# Patient Record
Sex: MALE | Race: OTHER | HISPANIC OR LATINO
[De-identification: names, ages, dates, MRNs, and addresses within clinical notes are randomized per-mention and may not be internally consistent; named-entity substitution may affect disease eponyms.]

---

## 2018-05-10 ENCOUNTER — TRANSCRIPTION ENCOUNTER (OUTPATIENT)
Age: 33
End: 2018-05-10

## 2018-05-11 PROBLEM — Z00.00 ENCOUNTER FOR PREVENTIVE HEALTH EXAMINATION: Status: ACTIVE | Noted: 2018-05-11

## 2018-05-12 ENCOUNTER — APPOINTMENT (OUTPATIENT)
Dept: ORTHOPEDIC SURGERY | Facility: CLINIC | Age: 33
End: 2018-05-12

## 2019-05-04 ENCOUNTER — EMERGENCY (EMERGENCY)
Facility: HOSPITAL | Age: 34
LOS: 1 days | Discharge: HOME | End: 2019-05-04
Attending: EMERGENCY MEDICINE
Payer: COMMERCIAL

## 2019-05-04 VITALS
TEMPERATURE: 99 F | OXYGEN SATURATION: 100 % | SYSTOLIC BLOOD PRESSURE: 137 MMHG | WEIGHT: 186.95 LBS | DIASTOLIC BLOOD PRESSURE: 94 MMHG | HEART RATE: 110 BPM | HEIGHT: 67 IN | RESPIRATION RATE: 20 BRPM

## 2019-05-04 PROCEDURE — 99282 EMERGENCY DEPT VISIT SF MDM: CPT

## 2019-05-04 NOTE — ED PROVIDER NOTE - ATTENDING CONTRIBUTION TO CARE
sore throat with right ear pain    mild dry cough    mild pharyngeal erythema    anterior cervical lymphadenoapthy    lungs clear   suppostive care

## 2019-05-04 NOTE — ED PROVIDER NOTE - PHYSICAL EXAMINATION
Mild bilateral anterior cervical chain lymphadenopathy. Mild pharyngeal erythema. No tonsillar swelling or exudates. Uvula is midline. No trismus. Neck supple. Full ROM.

## 2019-05-04 NOTE — ED PROVIDER NOTE - PROGRESS NOTE DETAILS
Pt is well appearing walking with steady gait, stable for discharge and follow up without fail with medical doctor. I had a detailed discussion with the patient and/or guardian regarding the historical points, exam findings, and any diagnostic results supporting the discharge diagnosis. Pt educated on care and need for follow up. Strict return instructions and red flag signs and symptoms discussed with patient. Questions answered. Pt shows understanding of discharge information and agrees to follow.

## 2019-05-04 NOTE — ED PROVIDER NOTE - OBJECTIVE STATEMENT
32 y/o M with no significant PMHx presents to the ED with complaints of sore throat x 3 days. Patient notes gradual onset of moderate sore throat, associated with mild R ear pain. Patient notes symptoms alleviated with Cepacol and salt water gargles. Pain is associated with mild dry cough. Denies fever, chills, difficulty swallowing, voice changes, facial swelling or any other acute complaints.

## 2019-05-04 NOTE — ED PROVIDER NOTE - CLINICAL SUMMARY MEDICAL DECISION MAKING FREE TEXT BOX
34 y/o M presents with throat pain. Patient is well appearing; vitals within normal limits and afebrile. History and findings suggestive of viral pharyngitis with some reaction lymphadenopathy. Advised supportive treatment and PMD follow up in 3-4 days. 34 y/o M presents with throat pain. Patient is well appearing; vitals within normal limits and afebrile. History and findings suggestive of viral pharyngitis with reactive lymphadenopathy. Advised supportive care and PMD follow up in 3-4 days.

## 2020-12-29 ENCOUNTER — EMERGENCY (EMERGENCY)
Facility: HOSPITAL | Age: 35
LOS: 0 days | Discharge: HOME | End: 2020-12-30
Attending: STUDENT IN AN ORGANIZED HEALTH CARE EDUCATION/TRAINING PROGRAM
Payer: SELF-PAY

## 2020-12-29 VITALS
DIASTOLIC BLOOD PRESSURE: 84 MMHG | OXYGEN SATURATION: 95 % | TEMPERATURE: 99 F | RESPIRATION RATE: 17 BRPM | WEIGHT: 190.04 LBS | HEIGHT: 66 IN | HEART RATE: 80 BPM | SYSTOLIC BLOOD PRESSURE: 138 MMHG

## 2020-12-29 DIAGNOSIS — R45.1 RESTLESSNESS AND AGITATION: ICD-10-CM

## 2020-12-29 DIAGNOSIS — F10.121 ALCOHOL ABUSE WITH INTOXICATION DELIRIUM: ICD-10-CM

## 2020-12-29 DIAGNOSIS — R00.0 TACHYCARDIA, UNSPECIFIED: ICD-10-CM

## 2020-12-29 DIAGNOSIS — R41.82 ALTERED MENTAL STATUS, UNSPECIFIED: ICD-10-CM

## 2020-12-29 DIAGNOSIS — R46.2 STRANGE AND INEXPLICABLE BEHAVIOR: ICD-10-CM

## 2020-12-29 LAB
ALBUMIN SERPL ELPH-MCNC: 4.1 G/DL — SIGNIFICANT CHANGE UP (ref 3.3–5)
ALP SERPL-CCNC: 108 U/L — SIGNIFICANT CHANGE UP (ref 40–120)
ALT FLD-CCNC: 66 U/L — SIGNIFICANT CHANGE UP (ref 12–78)
ANION GAP SERPL CALC-SCNC: 9 MMOL/L — SIGNIFICANT CHANGE UP (ref 5–17)
APAP SERPL-MCNC: < 2 UG/ML (ref 10–30)
AST SERPL-CCNC: 27 U/L — SIGNIFICANT CHANGE UP (ref 15–37)
BASOPHILS # BLD AUTO: 0.07 K/UL — SIGNIFICANT CHANGE UP (ref 0–0.2)
BASOPHILS NFR BLD AUTO: 0.9 % — SIGNIFICANT CHANGE UP (ref 0–2)
BILIRUB SERPL-MCNC: 0.2 MG/DL — SIGNIFICANT CHANGE UP (ref 0.2–1.2)
BUN SERPL-MCNC: 10 MG/DL — SIGNIFICANT CHANGE UP (ref 7–23)
CALCIUM SERPL-MCNC: 8.9 MG/DL — SIGNIFICANT CHANGE UP (ref 8.5–10.1)
CHLORIDE SERPL-SCNC: 106 MMOL/L — SIGNIFICANT CHANGE UP (ref 96–108)
CO2 SERPL-SCNC: 27 MMOL/L — SIGNIFICANT CHANGE UP (ref 22–31)
CREAT SERPL-MCNC: 1.01 MG/DL — SIGNIFICANT CHANGE UP (ref 0.5–1.3)
EOSINOPHIL # BLD AUTO: 0.25 K/UL — SIGNIFICANT CHANGE UP (ref 0–0.5)
EOSINOPHIL NFR BLD AUTO: 3.2 % — SIGNIFICANT CHANGE UP (ref 0–6)
ETHANOL SERPL-MCNC: 350 MG/DL — SIGNIFICANT CHANGE UP (ref 0–10)
GLUCOSE SERPL-MCNC: 98 MG/DL — SIGNIFICANT CHANGE UP (ref 70–99)
HCT VFR BLD CALC: 46.7 % — SIGNIFICANT CHANGE UP (ref 39–50)
HGB BLD-MCNC: 15.3 G/DL — SIGNIFICANT CHANGE UP (ref 13–17)
IMM GRANULOCYTES NFR BLD AUTO: 0.8 % — SIGNIFICANT CHANGE UP (ref 0–1.5)
LYMPHOCYTES # BLD AUTO: 1.56 K/UL — SIGNIFICANT CHANGE UP (ref 1–3.3)
LYMPHOCYTES # BLD AUTO: 19.9 % — SIGNIFICANT CHANGE UP (ref 13–44)
MCHC RBC-ENTMCNC: 30.8 PG — SIGNIFICANT CHANGE UP (ref 27–34)
MCHC RBC-ENTMCNC: 32.8 GM/DL — SIGNIFICANT CHANGE UP (ref 32–36)
MCV RBC AUTO: 94 FL — SIGNIFICANT CHANGE UP (ref 80–100)
MONOCYTES # BLD AUTO: 0.47 K/UL — SIGNIFICANT CHANGE UP (ref 0–0.9)
MONOCYTES NFR BLD AUTO: 6 % — SIGNIFICANT CHANGE UP (ref 2–14)
NEUTROPHILS # BLD AUTO: 5.41 K/UL — SIGNIFICANT CHANGE UP (ref 1.8–7.4)
NEUTROPHILS NFR BLD AUTO: 69.2 % — SIGNIFICANT CHANGE UP (ref 43–77)
NRBC # BLD: 0 /100 WBCS — SIGNIFICANT CHANGE UP (ref 0–0)
PLATELET # BLD AUTO: 388 K/UL — SIGNIFICANT CHANGE UP (ref 150–400)
POTASSIUM SERPL-MCNC: 3.3 MMOL/L — LOW (ref 3.5–5.3)
POTASSIUM SERPL-SCNC: 3.3 MMOL/L — LOW (ref 3.5–5.3)
PROT SERPL-MCNC: 8.1 GM/DL — SIGNIFICANT CHANGE UP (ref 6–8.3)
RBC # BLD: 4.97 M/UL — SIGNIFICANT CHANGE UP (ref 4.2–5.8)
RBC # FLD: 12.8 % — SIGNIFICANT CHANGE UP (ref 10.3–14.5)
SALICYLATES SERPL-MCNC: 2.9 MG/DL — SIGNIFICANT CHANGE UP (ref 2.8–20)
SODIUM SERPL-SCNC: 142 MMOL/L — SIGNIFICANT CHANGE UP (ref 135–145)
WBC # BLD: 7.82 K/UL — SIGNIFICANT CHANGE UP (ref 3.8–10.5)
WBC # FLD AUTO: 7.82 K/UL — SIGNIFICANT CHANGE UP (ref 3.8–10.5)

## 2020-12-29 PROCEDURE — 71045 X-RAY EXAM CHEST 1 VIEW: CPT | Mod: 26

## 2020-12-29 PROCEDURE — 93010 ELECTROCARDIOGRAM REPORT: CPT

## 2020-12-29 PROCEDURE — 99291 CRITICAL CARE FIRST HOUR: CPT

## 2020-12-29 RX ORDER — KETAMINE HYDROCHLORIDE 100 MG/ML
320 INJECTION INTRAMUSCULAR; INTRAVENOUS ONCE
Refills: 0 | Status: DISCONTINUED | OUTPATIENT
Start: 2020-12-29 | End: 2020-12-29

## 2020-12-29 RX ORDER — MIDAZOLAM HYDROCHLORIDE 1 MG/ML
5 INJECTION, SOLUTION INTRAMUSCULAR; INTRAVENOUS ONCE
Refills: 0 | Status: DISCONTINUED | OUTPATIENT
Start: 2020-12-29 | End: 2020-12-29

## 2020-12-29 RX ADMIN — MIDAZOLAM HYDROCHLORIDE 5 MILLIGRAM(S): 1 INJECTION, SOLUTION INTRAMUSCULAR; INTRAVENOUS at 20:25

## 2020-12-29 RX ADMIN — KETAMINE HYDROCHLORIDE 320 MILLIGRAM(S): 100 INJECTION INTRAMUSCULAR; INTRAVENOUS at 22:01

## 2020-12-29 NOTE — ED PROVIDER NOTE - PHYSICAL EXAMINATION
VITAL SIGNS: I have reviewed nursing notes and confirm.   GEN: Well-developed; well-nourished; in (+) severe agitation, no acute distress. (+) cursing at staff, Speaking full sentences. (+) ETOH on breath  SKIN: Warm, pink, no rash, no diaphoresis, no cyanosis, well perfused.   HEAD: Normocephalic; atraumatic. No scalp lacerations, no abrasions.  NECK: Supple; non tender.   EYES: Pupils 3mm equal, round, reactive to light and accomodation, conjunctiva and sclera clear. Extra-ocular movements intact bilaterally.  ENT: No nasal discharge; airway clear. Trachea is midline. ORAL: No oropharyngeal exudates or erythema. Normal dentition.  CV: (+) tachycardia. S1, S2 normal; no murmurs, gallops, or rubs. No lower extremity pitting edema bilaterally. Capillary refill < 2 seconds throughout. Distal pulses intact 2+ throughout.  RESP: CTA bilaterally. No wheezes, rales, or rhonchi.   ABD: Normal bowel sounds, soft, non-distended, non-tender, no hepatosplenomegaly. No CVA tenderness bilaterally.  MSK: Normal range of motion and movement of all 4 extremities. No joint or muscular pain throughout. No clubbing.   BACK: No thoracolumbar midline or paravertebral tenderness. No step-offs or obvious deformities.  NEURO: Alert & oriented x 3, Grossly unremarkable. Sensory and motor intact throughout. No focal deficits. Gait: Fluid.    PSYCH: Uncooperative, intoxicated.

## 2020-12-29 NOTE — ED PROVIDER NOTE - CLINICAL SUMMARY MEDICAL DECISION MAKING FREE TEXT BOX
ETOH related agitation likely, ddx: sympathomimetic toxidrome, PCP abuse. Sedated in ED with versed, ketamine, and placed in 4pt restraints. Found to have ETOH level 350. No other apparent traumatic injuries on exam. Labs unremarkable. CT HEAD and neck negative. awaiting clinical sobriety.

## 2020-12-29 NOTE — ED PROVIDER NOTE - PATIENT PORTAL LINK FT
You can access the FollowMyHealth Patient Portal offered by [facility] by registering at the following website: [email or web address]. By joining [employer]’s FollowMyHealth portal, you will also be able to view your health information using other applications (apps) compatible with our system.

## 2020-12-29 NOTE — ED PROVIDER NOTE - NSFOLLOWUPINSTRUCTIONS_ED_ALL_ED_FT
Alcohol Abuse    Alcohol intoxication occurs when the amount of alcohol that a person has consumed impairs his or her ability to mentally and physically function. Chronic alcohol consumption can also lead to a variety of health issues including neurological disease, stomach disease, heart disease, liver disease, etc. Do not drive after drinking alcohol. Drinking enough alcohol to end up in an Emergency Room suggests you may have an alcohol abuse problem. Seek help at a drug addiction center.    SEEK IMMEDIATE MEDICAL CARE IF YOU HAVE ANY OF THE FOLLOWING SYMPTOMS: seizures, vomiting blood, blood in your stool, lightheadedness/dizziness, or becoming shaky to tremulous when you stop drinking.     Take acetaminophen 650 mg orally every 6-8 hours for pain control as needed. Please do not exceed 4,000 mg of acetaminophen during a 24 hours period. Acetaminophen can be found in many over-the-counter cold medications as well as opioid medications that may be given for pain.    Take ibuprofen (also known as MOTRIN or ADVIL) 400 mg orally every 6-8 hours for pain control as needed with food to avoid an upset stomach. Ibuprofen can be found in many over-the-counter medications. Please do not take ibuprofen if you have a bleeding disorder, stomach or gastrointestinal ulcer, or liver disease.    If needed, you can alternate these medications so that you can take one medication every 3 hours. For example, at noon take ibuprofen, then at 3PM take acetaminophen, then at 6PM take ibuprofen. .    Rest, drink plenty of fluids.  Advance activity as tolerated.  Continue all previously prescribed medications as directed.  Follow up with your PMD 2-3 days and bring copies of your results.

## 2020-12-29 NOTE — ED ADULT NURSE NOTE - NSIMPLEMENTINTERV_GEN_ALL_ED
Implemented All Universal Safety Interventions:  [place] to call system. Call bell, personal items and telephone within reach. Instruct patient to call for assistance. Room bathroom lighting operational. Non-slip footwear when patient is off stretcher. Physically safe environment: no spills, clutter or unnecessary equipment. Stretcher in lowest position, wheels locked, appropriate side rails in place.

## 2020-12-29 NOTE — ED PROVIDER NOTE - PROGRESS NOTE DETAILS
s/p ketamine 4mg/kg, IM for agitated delerium after initial 5mg IM versed sedation wore off. unable to get ct head due to agitation. after ketamine, sedated, dissociated, tachycardic, endtidal 40-50s, no resp depression. etoh 350. d/c'd 4pt restraints, ct negative. ambulated to bathroom unassisted. likely agitated secondary to ETOH intoxication. Currently sleeping in stretcher. sleeping in stretcher, awaiting clinical sobriety. d/c'd 4pt restraints, ct negative. ambulated to bathroom unassisted. likely agitated secondary to ETOH intoxication. Currently sleeping in stretcher. tachycardia resolved. The patient is clinically sober. The patient is alert and oriented x 3, is clear and coherent in conversation and has a normal gait and shows no signs of acute intoxication. The patient is safe for discharge.

## 2020-12-29 NOTE — ED PROVIDER NOTE - OBJECTIVE STATEMENT
35M no pmhx presenting with agitation and bizarre behavior today. History obtained from EMS and police. Patient found walking around neighborhood, knocking on doors, intoxicated. Comes to the ED with handcuffs and severe agitation, uncooperative with staff and workup. (+) ETOH on breath. Treated with 5mg IM versed with sedation and placed on 4-pt restrains. Otherwise, no apparent traumatic injuries, fevers. 35M no pmhx presenting with agitation and bizarre behavior today. History obtained from EMS and police. Patient found walking around neighborhood, knocking on doors, intoxicated. Comes to the ED with handcuffs and severe agitation, uncooperative with staff and workup. (+) ETOH on breath. Treated with 5mg IM versed with sedation and placed on 4-pt restraints. Otherwise, no apparent traumatic injuries.

## 2020-12-29 NOTE — ED ADULT NURSE NOTE - OBJECTIVE STATEMENT
34 y/o Male BIBA and police found aggressive, belligerent. found in front of a house acting irrationally, dilate pupils. Pt was given versed and ketamine. 4 point restraint were applied. no circulation impairment

## 2020-12-30 VITALS
HEART RATE: 102 BPM | TEMPERATURE: 99 F | SYSTOLIC BLOOD PRESSURE: 112 MMHG | RESPIRATION RATE: 16 BRPM | OXYGEN SATURATION: 94 % | DIASTOLIC BLOOD PRESSURE: 77 MMHG

## 2020-12-30 PROCEDURE — 70450 CT HEAD/BRAIN W/O DYE: CPT | Mod: 26

## 2020-12-30 PROCEDURE — 72125 CT NECK SPINE W/O DYE: CPT | Mod: 26

## 2020-12-30 RX ORDER — MIDAZOLAM HYDROCHLORIDE 1 MG/ML
5 INJECTION, SOLUTION INTRAMUSCULAR; INTRAVENOUS ONCE
Refills: 0 | Status: COMPLETED | OUTPATIENT
Start: 2020-12-30 | End: 2020-12-30

## 2020-12-30 NOTE — ED ADULT NURSE REASSESSMENT NOTE - NS ED NURSE REASSESS COMMENT FT1
Received pt @ 510 PT is sleeping comfortably in bed no restraints present . Pt placed on EO will continue to monitor.

## 2021-05-03 ENCOUNTER — EMERGENCY (EMERGENCY)
Facility: HOSPITAL | Age: 36
LOS: 0 days | Discharge: HOME | End: 2021-05-03
Payer: SELF-PAY

## 2021-05-03 VITALS
SYSTOLIC BLOOD PRESSURE: 141 MMHG | DIASTOLIC BLOOD PRESSURE: 81 MMHG | RESPIRATION RATE: 17 BRPM | OXYGEN SATURATION: 98 % | HEART RATE: 114 BPM | WEIGHT: 190.04 LBS | TEMPERATURE: 99 F | HEIGHT: 67 IN

## 2021-05-03 DIAGNOSIS — R20.0 ANESTHESIA OF SKIN: ICD-10-CM

## 2021-05-03 DIAGNOSIS — F41.9 ANXIETY DISORDER, UNSPECIFIED: ICD-10-CM

## 2021-05-03 DIAGNOSIS — R06.4 HYPERVENTILATION: ICD-10-CM

## 2021-05-03 DIAGNOSIS — R07.9 CHEST PAIN, UNSPECIFIED: ICD-10-CM

## 2021-05-03 PROCEDURE — 93010 ELECTROCARDIOGRAM REPORT: CPT

## 2021-05-03 PROCEDURE — 99284 EMERGENCY DEPT VISIT MOD MDM: CPT

## 2021-05-03 RX ORDER — DIAZEPAM 5 MG
5 TABLET ORAL ONCE
Refills: 0 | Status: DISCONTINUED | OUTPATIENT
Start: 2021-05-03 | End: 2021-05-03

## 2021-05-03 RX ADMIN — Medication 5 MILLIGRAM(S): at 18:50

## 2021-05-03 NOTE — ED ADULT NURSE NOTE - PAIN RATING/NUMBER SCALE (0-10): REST
0
[FreeTextEntry1] : O:\par Vasc: DP 1/4 b/l, Unable to assess PT pulses due to wound location, Mild non-pitting edema b/l.  \par Derm: See wound assessment above.  Diffuse xerosis b/l.  hyperkeratotic border noted on left medial leg wound. \par Neuro: Decreased protective sensation b/l \par Musc: No pain with palpation periwound\par \par A: \par b/l leg non-pressure ulceration\par Venous stasis b/l\par \par P:\par Patient evaluated and chart reviewed.\par Wound cleansed with chlorhexidine scrub.\par Excisional debridement with sterile dermal curette of the left leg and right leg wounds with removal of non-viable skin and soft tissue down to and including the subcutaneous layer. \par Verbal consent obtained and Procedure tolerated well by the patient\par Wounds dressed with santyl, adaptic, 4x4 gauze, Binu, ACE\par RTC in 1 week

## 2021-05-03 NOTE — ED ADULT TRIAGE NOTE - NSWEIGHTCALCTOOLDRUG_GEN_A_CORE
Patient reports to clinic today with wrap off stating she took it off the day after applied as was too tight.  Patient had put on tetragrip.  No trauma to leg.  Leg warm and dry-no redness.      Wrap re-applied and patient reports feels ok.  Reviewed the proper procedure of removing wrap and wearing tetragrip should wrap be too tight again.    
 used

## 2021-05-03 NOTE — ED PROVIDER NOTE - PHYSICAL EXAMINATION
Physical Exam    Vital Signs: I have reviewed the initial vital signs.  Constitutional: well-nourished, appears stated age, no acute distress  Eyes: PERRLA, and symmetrical lids.  ENT: Neck supple with no adenopathy, moist MM.  Cardiovascular: regular rate, regular rhythm, well-perfused extremities  Respiratory: unlabored respiratory effort, clear to auscultation bilaterally  Gastrointestinal: soft, non-tender abdomen, no pulsatile mass  Musculoskeletal: supple neck, no lower extremity edema  Integumentary: warm, dry, no rash  Neurologic: extremities’ motor and sensory functions grossly intact  Psychiatric: A&Ox3

## 2021-05-03 NOTE — ED ADULT TRIAGE NOTE - CHIEF COMPLAINT QUOTE
36 y/o male with no PMH. BIBA with c/c of feeling anxious, pins and needle feeling in both hands that started at 1500 today. pt denies any N/V/D/C OR dizziness. no prodromal or focal deficit.

## 2021-05-03 NOTE — ED PROVIDER NOTE - OBJECTIVE STATEMENT
34 yo m pw chest pain that occurred while pt was sitting there and it was associated with b/l hand cramping with and paresthesias and fast breathing. No resolved. No unilateral swelling, hemoptysis, estogen supplementation, malignancy, recent immobilization or surgery, or prior DVT/PE.    I have reviewed available current nursing and previous documentation of past medical, surgical, family, and/or social history.

## 2021-05-03 NOTE — ED PROVIDER NOTE - CARE PROVIDER_API CALL
Nehemiah Enriquez)  Cardiology; Cardiovascular Disease; Nuclear Cardiology  [address] [place] [address]  Phone: [phone]  Fax: [phone]  Follow Up Time:

## 2021-05-03 NOTE — ED ADULT NURSE NOTE - OBJECTIVE STATEMENT
Patient presents in ED complaining of numbness in both arms and chest pressure, since this afternoon

## 2021-05-03 NOTE — ED PROVIDER NOTE - PATIENT PORTAL LINK FT
You can access the FollowMyHealth Patient Portal offered by [facility] by registering at the following website: [email or web address]. By joining [employer]’s FollowMyHealth portal, you will also be able to view your health information using other applications (apps) compatible with our system.

## 2021-05-04 PROBLEM — Z78.9 OTHER SPECIFIED HEALTH STATUS: Chronic | Status: ACTIVE | Noted: 2020-12-30

## 2021-05-23 ENCOUNTER — EMERGENCY (EMERGENCY)
Facility: HOSPITAL | Age: 36
LOS: 0 days | Discharge: HOME | End: 2021-05-24
Attending: STUDENT IN AN ORGANIZED HEALTH CARE EDUCATION/TRAINING PROGRAM
Payer: SELF-PAY

## 2021-05-23 VITALS
OXYGEN SATURATION: 98 % | WEIGHT: 197.98 LBS | HEIGHT: 67 IN | HEART RATE: 103 BPM | RESPIRATION RATE: 22 BRPM | TEMPERATURE: 98 F | SYSTOLIC BLOOD PRESSURE: 145 MMHG | DIASTOLIC BLOOD PRESSURE: 91 MMHG

## 2021-05-23 DIAGNOSIS — F41.0 PANIC DISORDER [EPISODIC PAROXYSMAL ANXIETY]: ICD-10-CM

## 2021-05-23 DIAGNOSIS — F41.9 ANXIETY DISORDER, UNSPECIFIED: ICD-10-CM

## 2021-05-23 DIAGNOSIS — F12.99 CANNABIS USE, UNSPECIFIED WITH UNSPECIFIED CANNABIS-INDUCED DISORDER: ICD-10-CM

## 2021-05-23 LAB
ALBUMIN SERPL ELPH-MCNC: 3.8 G/DL — SIGNIFICANT CHANGE UP (ref 3.3–5)
ALP SERPL-CCNC: 96 U/L — SIGNIFICANT CHANGE UP (ref 40–120)
ALT FLD-CCNC: 48 U/L — SIGNIFICANT CHANGE UP (ref 12–78)
ANION GAP SERPL CALC-SCNC: 10 MMOL/L — SIGNIFICANT CHANGE UP (ref 5–17)
AST SERPL-CCNC: 25 U/L — SIGNIFICANT CHANGE UP (ref 15–37)
BASOPHILS # BLD AUTO: 0.08 K/UL — SIGNIFICANT CHANGE UP (ref 0–0.2)
BASOPHILS NFR BLD AUTO: 0.8 % — SIGNIFICANT CHANGE UP (ref 0–2)
BILIRUB SERPL-MCNC: 0.2 MG/DL — SIGNIFICANT CHANGE UP (ref 0.2–1.2)
BUN SERPL-MCNC: 13 MG/DL — SIGNIFICANT CHANGE UP (ref 7–23)
CALCIUM SERPL-MCNC: 8.4 MG/DL — LOW (ref 8.5–10.1)
CHLORIDE SERPL-SCNC: 104 MMOL/L — SIGNIFICANT CHANGE UP (ref 96–108)
CO2 SERPL-SCNC: 25 MMOL/L — SIGNIFICANT CHANGE UP (ref 22–31)
CREAT SERPL-MCNC: 1.16 MG/DL — SIGNIFICANT CHANGE UP (ref 0.5–1.3)
EOSINOPHIL # BLD AUTO: 0.12 K/UL — SIGNIFICANT CHANGE UP (ref 0–0.5)
EOSINOPHIL NFR BLD AUTO: 1.2 % — SIGNIFICANT CHANGE UP (ref 0–6)
GLUCOSE SERPL-MCNC: 113 MG/DL — HIGH (ref 70–99)
HCT VFR BLD CALC: 42.1 % — SIGNIFICANT CHANGE UP (ref 39–50)
HGB BLD-MCNC: 14.3 G/DL — SIGNIFICANT CHANGE UP (ref 13–17)
IMM GRANULOCYTES NFR BLD AUTO: 1 % — SIGNIFICANT CHANGE UP (ref 0–1.5)
LYMPHOCYTES # BLD AUTO: 1.96 K/UL — SIGNIFICANT CHANGE UP (ref 1–3.3)
LYMPHOCYTES # BLD AUTO: 19.4 % — SIGNIFICANT CHANGE UP (ref 13–44)
MCHC RBC-ENTMCNC: 31.4 PG — SIGNIFICANT CHANGE UP (ref 27–34)
MCHC RBC-ENTMCNC: 34 GM/DL — SIGNIFICANT CHANGE UP (ref 32–36)
MCV RBC AUTO: 92.3 FL — SIGNIFICANT CHANGE UP (ref 80–100)
MONOCYTES # BLD AUTO: 0.71 K/UL — SIGNIFICANT CHANGE UP (ref 0–0.9)
MONOCYTES NFR BLD AUTO: 7 % — SIGNIFICANT CHANGE UP (ref 2–14)
NEUTROPHILS # BLD AUTO: 7.12 K/UL — SIGNIFICANT CHANGE UP (ref 1.8–7.4)
NEUTROPHILS NFR BLD AUTO: 70.6 % — SIGNIFICANT CHANGE UP (ref 43–77)
NRBC # BLD: 0 /100 WBCS — SIGNIFICANT CHANGE UP (ref 0–0)
PLATELET # BLD AUTO: 343 K/UL — SIGNIFICANT CHANGE UP (ref 150–400)
POTASSIUM SERPL-MCNC: 3.2 MMOL/L — LOW (ref 3.5–5.3)
POTASSIUM SERPL-SCNC: 3.2 MMOL/L — LOW (ref 3.5–5.3)
PROT SERPL-MCNC: 7.5 GM/DL — SIGNIFICANT CHANGE UP (ref 6–8.3)
RBC # BLD: 4.56 M/UL — SIGNIFICANT CHANGE UP (ref 4.2–5.8)
RBC # FLD: 12.8 % — SIGNIFICANT CHANGE UP (ref 10.3–14.5)
SODIUM SERPL-SCNC: 139 MMOL/L — SIGNIFICANT CHANGE UP (ref 135–145)
WBC # BLD: 10.09 K/UL — SIGNIFICANT CHANGE UP (ref 3.8–10.5)
WBC # FLD AUTO: 10.09 K/UL — SIGNIFICANT CHANGE UP (ref 3.8–10.5)

## 2021-05-23 PROCEDURE — 93010 ELECTROCARDIOGRAM REPORT: CPT

## 2021-05-23 PROCEDURE — 99285 EMERGENCY DEPT VISIT HI MDM: CPT

## 2021-05-23 PROCEDURE — 70450 CT HEAD/BRAIN W/O DYE: CPT | Mod: 26,MA

## 2021-05-23 RX ADMIN — Medication 1 MILLIGRAM(S): at 22:58

## 2021-05-23 NOTE — ED ADULT TRIAGE NOTE - CHIEF COMPLAINT QUOTE
Patient presents in ED complaining of numbness of both hands hyperventilating, dizziness. after watching TV

## 2021-05-23 NOTE — ED ADULT NURSE NOTE - OBJECTIVE STATEMENT
Pt alert and oriented, anxious, presents to ED complaining of numbness of both hands hyperventilating, dizziness. after watching TV. Pt stated had similar attack 2 weeks ago and was in the ED and was suppose to f/u with primary MD as recommended but did not. Pt stated also took aspirin 81mg for the chest pain. Pt denies any further PMH,

## 2021-05-23 NOTE — ED ADULT NURSE NOTE - NSIMPLEMENTINTERV_GEN_ALL_ED
Implemented All Universal Safety Interventions:  [place] to call system. Call bell, personal items and telephone within reach. Instruct patient to call for assistance. Room bathroom lighting operational. Non-slip footwear when patient is off stretcher. Physically safe environment: no spills, clutter or unnecessary equipment. Stretcher in lowest position, wheels locked, appropriate side rails in place.

## 2021-05-23 NOTE — ED PROVIDER NOTE - PATIENT PORTAL LINK FT
You can access the FollowMyHealth Patient Portal offered by [facility] by registering at the following website: [email or web address]. By joining [employer]’s FollowMyHealth portal, you will also be able to view your health information using other applications (apps) compatible with our system.

## 2021-05-23 NOTE — ED PROVIDER NOTE - CONSTITUTIONAL, MLM
Patient appears anxious. But awake, alert, oriented to person, place, time/situation and in no apparent distress. normal...

## 2021-05-23 NOTE — ED PROVIDER NOTE - OBJECTIVE STATEMENT
37 y/o M with no pertinent PMHx presents to the ED c/o a panic attack. Patient states he felt his body, hands and chest become tight. Patient also reports hyperventilating and having palpitations. Patient states he has been under a lot of stress recently as he found out his grandmother is dying. Patient was seen in the hospital for similar episodes in the past. Denies recent surgery, fever, chills or any other pain related symptoms. Patient states he smokes marijuana daily and admits to smoking today. Denies the marijuana being laced with any other substances. 35 y/o M with no pertinent PMHx presents to the ED c/o a panic attack. Patient states he felt his body, hands and chest become tight. Patient also reports hyperventilating and having palpitations. Patient states he has been under a lot of stress recently as he found out his grandmother is dying. Patient was seen in the hospital for similar episodes in the past, diagnosed with anxiety. Denies recent surgery, fever, chills or any other pain related symptoms. Patient states he smokes marijuana daily and admits to smoking today. Denies the marijuana being laced with any other substances.

## 2021-05-24 VITALS
OXYGEN SATURATION: 100 % | DIASTOLIC BLOOD PRESSURE: 88 MMHG | SYSTOLIC BLOOD PRESSURE: 134 MMHG | TEMPERATURE: 98 F | RESPIRATION RATE: 16 BRPM | HEART RATE: 84 BPM

## 2021-05-24 PROCEDURE — 71045 X-RAY EXAM CHEST 1 VIEW: CPT | Mod: 26

## 2021-05-24 RX ORDER — POTASSIUM CHLORIDE 20 MEQ
40 PACKET (EA) ORAL ONCE
Refills: 0 | Status: COMPLETED | OUTPATIENT
Start: 2021-05-24 | End: 2021-05-24

## 2021-05-24 RX ADMIN — Medication 40 MILLIEQUIVALENT(S): at 01:10

## 2022-08-10 ENCOUNTER — EMERGENCY (EMERGENCY)
Facility: HOSPITAL | Age: 37
LOS: 0 days | Discharge: HOME | End: 2022-08-10
Attending: STUDENT IN AN ORGANIZED HEALTH CARE EDUCATION/TRAINING PROGRAM

## 2022-08-10 VITALS
OXYGEN SATURATION: 99 % | SYSTOLIC BLOOD PRESSURE: 139 MMHG | RESPIRATION RATE: 18 BRPM | TEMPERATURE: 98 F | HEART RATE: 78 BPM | DIASTOLIC BLOOD PRESSURE: 77 MMHG

## 2022-08-10 VITALS
TEMPERATURE: 99 F | RESPIRATION RATE: 18 BRPM | OXYGEN SATURATION: 97 % | WEIGHT: 184.97 LBS | DIASTOLIC BLOOD PRESSURE: 71 MMHG | HEART RATE: 98 BPM | HEIGHT: 67 IN | SYSTOLIC BLOOD PRESSURE: 137 MMHG

## 2022-08-10 DIAGNOSIS — F41.9 ANXIETY DISORDER, UNSPECIFIED: ICD-10-CM

## 2022-08-10 DIAGNOSIS — R20.0 ANESTHESIA OF SKIN: ICD-10-CM

## 2022-08-10 DIAGNOSIS — R00.2 PALPITATIONS: ICD-10-CM

## 2022-08-10 PROCEDURE — 99284 EMERGENCY DEPT VISIT MOD MDM: CPT

## 2022-08-10 PROCEDURE — 93010 ELECTROCARDIOGRAM REPORT: CPT

## 2022-08-10 RX ORDER — ALPRAZOLAM 0.25 MG
1 TABLET ORAL ONCE
Refills: 0 | Status: DISCONTINUED | OUTPATIENT
Start: 2022-08-10 | End: 2022-08-10

## 2022-08-10 RX ORDER — ALPRAZOLAM 0.25 MG
1 TABLET ORAL
Qty: 15 | Refills: 0
Start: 2022-08-10 | End: 2022-08-14

## 2022-08-10 RX ADMIN — Medication 1 MILLIGRAM(S): at 22:58

## 2022-08-10 NOTE — ED PROVIDER NOTE - DOMESTIC TRAVEL HIGH RISK QUESTION
Well appearing, awake, alert, oriented to person, place, time/situation and in no apparent distress. No Yes normal...

## 2022-08-10 NOTE — ED PROVIDER NOTE - NSFOLLOWUPINSTRUCTIONS_ED_ALL_ED_FT
Activities as tolerated. Please encourage good oral and fluid intake. For pain, please take Motrin 400mg every 4 hours as needed, or Tylenol 650mg every 6 hours as needed.    For anxiety attack, please take xanax 1mg every 3 hours as needed. Do not drive if taking this medication.    Please see your primary care doctor within 24-48 hours for further management of your symptoms.    Please seek emergent medical management if you have any worsening signs or symptoms, such as worsening chest pain, difficulty breathing, loss of consciousness, or persistent vomiting.

## 2022-08-10 NOTE — ED PROVIDER NOTE - CLINICAL SUMMARY MEDICAL DECISION MAKING FREE TEXT BOX
38 yo male with pmhx anxiety p/w anxiety attack/palpitations. suggestive of anxiety attack. ekg shows NSR. no signs of ischemia or arrhythmia. will give bzd, will reassess.

## 2022-08-10 NOTE — ED ADULT NURSE REASSESSMENT NOTE - NS ED NURSE REASSESS COMMENT FT1
Pt A&Ox4, pt verbalizing that he is feeling better. Pt denies any CP or palpitations.. Skin warm and dry.

## 2022-08-10 NOTE — ED PROVIDER NOTE - OBJECTIVE STATEMENT
38 yo male with pmhx anxiety p/w anxiety attack/palpitations. was in office watching tv when he had sudden onset palpitations and BL hand/leg numbness/cramping. improved with lorazepam, but then returned again. came to ed. had similar presentation in past, dx with anxiety attack.    denies travel hx, loc.

## 2022-08-10 NOTE — ED PROVIDER NOTE - PATIENT PORTAL LINK FT
You can access the FollowMyHealth Patient Portal offered by [facility] by registering at the following website: [email or web address]. By joining [employer]’s FollowMyHealth portal, you will also be able to view your health information using other applications (apps) compatible with our system.

## 2022-08-10 NOTE — ED ADULT NURSE NOTE - OBJECTIVE STATEMENT
Pt presents to the ED for evaluation of possible panic attach. Pt verbalized that he had a similar episode last year. Pt reports experiencing CP, clenching fist and sweating. Pt verbalized that he was taking lorazepam last year that help relieved s/s. EKG done and read by MD.

## 2022-08-10 NOTE — ED ADULT TRIAGE NOTE - CHIEF COMPLAINT QUOTE
pt presents to the ED c/o midsternal chest pain and b/l hand cramping started today. pt states feels like anxiety attack had last year and given lorazepam and had relief. denies: SI, HI, hallucinations   hx anxiety. denies sob

## 2022-10-21 NOTE — ED ADULT TRIAGE NOTE - PAIN RATING/NUMBER SCALE (0-10): ACTIVITY
8 Dutasteride Pregnancy And Lactation Text: This medication is absolutely contraindicated in women, especially during pregnancy and breast feeding. Feminization of male fetuses is possible if taking while pregnant.

## 2024-01-12 ENCOUNTER — NON-APPOINTMENT (OUTPATIENT)
Age: 39
End: 2024-01-12

## 2025-01-05 NOTE — ED ADULT NURSE NOTE - CAS EDP DISCH TYPE
Physical Therapy Visit    Visit Type: Daily Treatment Note  Visit: 4  Referring Provider: Bianca Stearns MD  Medical Diagnosis (from order): M54.9 - Upper back pain     SUBJECTIVE                                                                                                               Chief complaint: lower back pain, increased pain with bending, getting up in the morning.    Patient reports lower back is doing better. Had one day last weekend that was sore after lifting but had no lower back pain with lifting or for the rest of the weekend.      OBJECTIVE                                                                                                                                         Treatment     Therapeutic Exercise  Performed:  Matrix leg press 190lbs x 15 reps  Step ups x 10 reps with a 20lbs   Iso step ups lunge x 10 reps  abdominal series   Bridging x 15 reps  Opposite arm and leg raises in 4 point   paloff press with 15lbs on cables each side    Prone lying x 1 minutes  Prone on elbows x 1 minutes  Prone press ups 2 x 10 reps    TRX: gilberto dead lifts' x 10 reps, mountain climbers x 12 reps, lower back stretch x 12 reps  Swiss ball hip extensions x 10 reps  Swiss ball hip lifts x 10 rep  Swiss ball passes in supine x 10 reps  Swiss ball crab walks x 10 reps  Isometrics swiss ball holds in supine x 10 reps  Plank circles on stability ball x 12 reps    Manual Therapy   No tenderness to palpation at L1-5 with central posterior anterior glides    Skilled input: verbal instruction/cues and tactile instruction/cues    Writer verbally educated and received verbal consent for hand placement, positioning of patient, and techniques to be performed today from patient for clothing adjustments for techniques as described above and how they are pertinent to the patient's plan of care.  Home Exercise Program  Access Code: [email or web address]  URL: [email or web address]  Date: 12/31/2024  Prepared by:  Keven Greenwood    Exercises  - Lying Prone  - 6 x daily - 7 x weekly - 1 sets - 1 reps - 60 hold  - Static Prone on Elbows  - 6 x daily - 7 x weekly - 1 sets - 1 reps - 60 hold  - Prone Press Up  - 6 x daily - 7 x weekly - 2 sets - 10 reps - 2 hold  - Supine Bridge  - 1 x daily - 7 x weekly - 1 sets - 15 reps - 2 hold  - Standing Anti-Rotation Press with Anchored Resistance  - 1 x daily - 7 x weekly - 2 sets - 15 reps - 2 hold  - Bird Dog on Swiss Ball  - 1 x daily - 7 x weekly - 1 sets - 15 reps - 2 hold    ASSESSMENT                                                                                                            Patient with central lower back pain continuing to respond well to lumbar extension movements. No tenderness noted to palpation with central posterior anterior mobilizations. Improving core strength and posture.  Education:   - Results of above outlined education: Verbalizes understanding and Demonstrates understanding    PLAN                                                                                                                           Suggestions for next session as indicated: Progress per plan of care     Therapy procedure time and total treatment time can be found documented on the Time Entry flowsheet     Home